# Patient Record
Sex: FEMALE | Race: BLACK OR AFRICAN AMERICAN | NOT HISPANIC OR LATINO | URBAN - METROPOLITAN AREA
[De-identification: names, ages, dates, MRNs, and addresses within clinical notes are randomized per-mention and may not be internally consistent; named-entity substitution may affect disease eponyms.]

---

## 2017-11-06 PROBLEM — 428283002 HISTORY OF POLYP OF COLON: Status: ACTIVE | Noted: 2017-11-06

## 2020-03-10 PROBLEM — 13645005 CHRONIC OBSTRUCTIVE PULMONARY DISEASE: Status: ACTIVE | Noted: 2020-03-10

## 2020-04-21 PROBLEM — 47505003 POSTTRAUMATIC STRESS DISORDER: Status: ACTIVE | Noted: 2020-04-21

## 2020-12-24 ENCOUNTER — OFFICE VISIT (OUTPATIENT)
Dept: URBAN - METROPOLITAN AREA CLINIC 44 | Facility: CLINIC | Age: 60
End: 2020-12-24

## 2020-12-28 ENCOUNTER — OFFICE VISIT (OUTPATIENT)
Dept: URBAN - METROPOLITAN AREA CLINIC 46 | Facility: CLINIC | Age: 60
End: 2020-12-28

## 2021-06-10 ENCOUNTER — OFFICE VISIT (OUTPATIENT)
Dept: URBAN - METROPOLITAN AREA CLINIC 44 | Facility: CLINIC | Age: 61
End: 2021-06-10

## 2021-08-28 ENCOUNTER — TELEPHONE ENCOUNTER (OUTPATIENT)
Dept: URBAN - METROPOLITAN AREA CLINIC 13 | Facility: CLINIC | Age: 61
End: 2021-08-28

## 2021-08-28 RX ORDER — GABAPENTIN 100 MG/1
CAPSULE ORAL
OUTPATIENT
End: 2018-09-24

## 2021-08-28 RX ORDER — DEXLANSOPRAZOLE 60 MG/1
CAPSULE, DELAYED RELEASE ORAL
OUTPATIENT
End: 2020-12-24

## 2021-08-28 RX ORDER — DICLOFENAC SODIUM 1 %
GEL (GRAM) TOPICAL
OUTPATIENT
End: 2018-09-24

## 2021-08-28 RX ORDER — IBUPROFEN 400 MG/1
TABLET, FILM COATED ORAL
OUTPATIENT
End: 2019-11-07

## 2021-08-28 RX ORDER — CYCLOBENZAPRINE HYDROCHLORIDE 5 MG/1
TABLET, FILM COATED ORAL
OUTPATIENT
End: 2017-11-07

## 2021-08-28 RX ORDER — ESTRADIOL 1 MG/1
TABLET ORAL
OUTPATIENT
End: 2018-09-24

## 2021-08-28 RX ORDER — PROMETHAZINE HYDROCHLORIDE 12.5 MG/1
TABLET ORAL
OUTPATIENT
End: 2020-04-21

## 2021-08-28 RX ORDER — MEDROXYPROGESTERONE ACETATE 5 MG/1
TABLET ORAL
OUTPATIENT
End: 2018-09-24

## 2021-08-28 RX ORDER — CYCLOBENZAPRINE HYDROCHLORIDE 10 MG/1
TABLET, FILM COATED ORAL
OUTPATIENT
End: 2021-06-10

## 2021-08-28 RX ORDER — ASPIRIN 81 MG/1
TABLET, COATED ORAL
OUTPATIENT
End: 2019-11-07

## 2021-08-28 RX ORDER — ESTROGENS, CONJUGATED 0.9 MG/1
TABLET, FILM COATED ORAL
OUTPATIENT
End: 2018-09-24

## 2021-08-28 RX ORDER — DEXLANSOPRAZOLE 60 MG/1
CAPSULE, DELAYED RELEASE ORAL
OUTPATIENT
End: 2017-12-07

## 2021-08-28 RX ORDER — LIDOCAINE 50 MG/G
OINTMENT TOPICAL
OUTPATIENT
End: 2018-09-24

## 2021-08-28 RX ORDER — PROMETHAZINE HYDROCHLORIDE 12.5 MG/1
TABLET ORAL
OUTPATIENT
End: 2019-11-07

## 2021-08-28 RX ORDER — FLUCONAZOLE 100 MG/1
TABLET ORAL
OUTPATIENT
Start: 2020-03-10 | End: 2020-04-21

## 2021-08-29 ENCOUNTER — TELEPHONE ENCOUNTER (OUTPATIENT)
Dept: URBAN - METROPOLITAN AREA CLINIC 13 | Facility: CLINIC | Age: 61
End: 2021-08-29

## 2021-08-29 RX ORDER — DEXLANSOPRAZOLE 60 MG/1
CAPSULE, DELAYED RELEASE ORAL
Status: ACTIVE | COMMUNITY

## 2021-08-29 RX ORDER — DICLOFENAC SODIUM 75 MG/1
TABLET, DELAYED RELEASE ORAL
Status: ACTIVE | COMMUNITY

## 2021-08-29 RX ORDER — PROMETHAZINE HYDROCHLORIDE 12.5 MG/1
TABLET ORAL
Status: ACTIVE | COMMUNITY

## 2021-08-29 RX ORDER — ESTROGEN,CON/M-PROGEST ACET 0.625-5 MG
TABLET ORAL
Status: ACTIVE | COMMUNITY

## 2021-08-29 RX ORDER — ONDANSETRON HYDROCHLORIDE 4 MG/1
TABLET, FILM COATED ORAL
Status: ACTIVE | COMMUNITY

## 2021-08-29 RX ORDER — CYCLOSPORINE 0.5 MG/ML
EMULSION OPHTHALMIC
Status: ACTIVE | COMMUNITY

## 2021-08-29 RX ORDER — DIAZEPAM 5 MG/1
TABLET ORAL
Status: ACTIVE | COMMUNITY

## 2021-10-28 ENCOUNTER — ERX REFILL RESPONSE (OUTPATIENT)
Dept: URBAN - METROPOLITAN AREA CLINIC 44 | Facility: CLINIC | Age: 61
End: 2021-10-28

## 2021-10-28 RX ORDER — ONDANSETRON HYDROCHLORIDE 4 MG/1
TABLET, FILM COATED ORAL
OUTPATIENT

## 2021-10-28 RX ORDER — ONDANSETRON 4 MG/1
DISSOLVE 1 TABLET ON THE TONGUE EVERY 6 TO 8 HOURS AS NEEDED TABLET, ORALLY DISINTEGRATING ORAL
Qty: 20 TABLET | Refills: 1 | OUTPATIENT

## 2021-12-20 ENCOUNTER — OFFICE VISIT (OUTPATIENT)
Dept: URBAN - METROPOLITAN AREA CLINIC 44 | Facility: CLINIC | Age: 61
End: 2021-12-20

## 2022-01-18 ENCOUNTER — DASHBOARD ENCOUNTERS (OUTPATIENT)
Age: 62
End: 2022-01-18

## 2022-01-19 ENCOUNTER — LAB OUTSIDE AN ENCOUNTER (OUTPATIENT)
Dept: URBAN - METROPOLITAN AREA CLINIC 44 | Facility: CLINIC | Age: 62
End: 2022-01-19

## 2022-01-19 ENCOUNTER — OFFICE VISIT (OUTPATIENT)
Dept: URBAN - METROPOLITAN AREA CLINIC 44 | Facility: CLINIC | Age: 62
End: 2022-01-19
Payer: MEDICARE

## 2022-01-19 VITALS
BODY MASS INDEX: 22.08 KG/M2 | DIASTOLIC BLOOD PRESSURE: 85 MMHG | SYSTOLIC BLOOD PRESSURE: 154 MMHG | TEMPERATURE: 98.8 F | HEART RATE: 61 BPM | HEIGHT: 63 IN | OXYGEN SATURATION: 98 % | WEIGHT: 124.6 LBS

## 2022-01-19 DIAGNOSIS — K21.9 GERD: ICD-10-CM

## 2022-01-19 DIAGNOSIS — B37.81 CANDIDAL ESOPHAGITIS: ICD-10-CM

## 2022-01-19 DIAGNOSIS — K61.1 PERIRECTAL ABSCESS: ICD-10-CM

## 2022-01-19 DIAGNOSIS — L98.8 FISTULA: ICD-10-CM

## 2022-01-19 PROBLEM — 235595009 GASTROESOPHAGEAL REFLUX DISEASE: Status: ACTIVE | Noted: 2017-11-06

## 2022-01-19 PROCEDURE — 99213 OFFICE O/P EST LOW 20 MIN: CPT | Performed by: INTERNAL MEDICINE

## 2022-01-19 RX ORDER — ONDANSETRON 4 MG/1
DISSOLVE 1 TABLET ON THE TONGUE EVERY 6 TO 8 HOURS AS NEEDED TABLET, ORALLY DISINTEGRATING ORAL
Qty: 20 TABLET | Refills: 1 | Status: DISCONTINUED | COMMUNITY

## 2022-01-19 RX ORDER — DIAZEPAM 5 MG/1
1 TABLET AS NEEDED TABLET ORAL
Status: ON HOLD | COMMUNITY

## 2022-01-19 RX ORDER — ESTROGEN,CON/M-PROGEST ACET 0.625-5 MG
TABLET ORAL
Status: DISCONTINUED | COMMUNITY

## 2022-01-19 RX ORDER — DICLOFENAC SODIUM 75 MG/1
1 TABLET AS NEEDED TABLET, DELAYED RELEASE ORAL TWICE A DAY
Status: ACTIVE | COMMUNITY

## 2022-01-19 RX ORDER — CYCLOSPORINE 0.5 MG/ML
1 DROP INTO AFFECTED EYE EMULSION OPHTHALMIC TWICE A DAY
Status: ACTIVE | COMMUNITY

## 2022-01-19 RX ORDER — LISINOPRIL AND HYDROCHLOROTHIAZIDE 10; 12.5 MG/1; MG/1
1 TABLET TABLET ORAL ONCE A DAY
Status: ON HOLD | COMMUNITY

## 2022-01-19 RX ORDER — CEPHALEXIN 500 MG/1
1 CAPSULE CAPSULE ORAL TWICE A DAY
Status: ACTIVE | COMMUNITY
Start: 2022-01-16 | End: 2022-01-23

## 2022-01-19 RX ORDER — IBUPROFEN 600 MG/1
1 TABLET WITH FOOD OR MILK AS NEEDED TABLET ORAL
Status: ACTIVE | COMMUNITY

## 2022-01-19 RX ORDER — PROMETHAZINE HYDROCHLORIDE 12.5 MG/1
TABLET ORAL
Status: DISCONTINUED | COMMUNITY

## 2022-01-19 RX ORDER — DEXLANSOPRAZOLE 60 MG/1
1 CAPSULE CAPSULE, DELAYED RELEASE ORAL ONCE A DAY
Status: ACTIVE | COMMUNITY
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
## 2022-02-24 ENCOUNTER — ERX REFILL RESPONSE (OUTPATIENT)
Dept: URBAN - METROPOLITAN AREA CLINIC 44 | Facility: CLINIC | Age: 62
End: 2022-02-24

## 2022-02-24 RX ORDER — ONDANSETRON 4 MG/1
DISSOLVE ONE TABLET BY MOUTH EVERY 6-8 HOURS AS NEEDED TABLET, ORALLY DISINTEGRATING ORAL
Qty: 20 TABLET | Refills: 0 | OUTPATIENT

## 2022-02-24 RX ORDER — ONDANSETRON 4 MG/1
DISSOLVE ONE TABLET BY MOUTH EVERY 6-8 HOURS AS NEEDED TABLET, ORALLY DISINTEGRATING ORAL
Qty: 20 TABLET | Refills: 1 | OUTPATIENT

## 2022-03-18 ENCOUNTER — ERX REFILL RESPONSE (OUTPATIENT)
Dept: URBAN - METROPOLITAN AREA CLINIC 44 | Facility: CLINIC | Age: 62
End: 2022-03-18

## 2022-03-18 RX ORDER — PROMETHAZINE HYDROCHLORIDE 12.5 MG/1
TAKE 1 TABLET BY MOUTH ONCE DAILY ALTERNATE ONDANSETRON AS NEEDED TABLET ORAL
Qty: 20 TABLET | Refills: 0 | OUTPATIENT

## 2022-03-18 RX ORDER — PROMETHAZINE HYDROCHLORIDE 12.5 MG/1
TAKE 1 TABLET BY MOUTH ONCE DAILY ALTERNATE ONDANSETRON AS NEEDED TABLET ORAL
Qty: 20 TABLET | Refills: 1 | OUTPATIENT

## 2022-06-22 ENCOUNTER — TELEPHONE ENCOUNTER (OUTPATIENT)
Dept: URBAN - METROPOLITAN AREA CLINIC 46 | Facility: CLINIC | Age: 62
End: 2022-06-22

## 2022-06-22 RX ORDER — PROMETHAZINE HYDROCHLORIDE 12.5 MG/1
TAKE 1 TABLET BY MOUTH ONCE DAILY ALTERNATE ONDANSETRON AS NEEDED TABLET ORAL
Qty: 20 TABLET | Refills: 1

## 2022-06-27 ENCOUNTER — ERX REFILL RESPONSE (OUTPATIENT)
Dept: URBAN - METROPOLITAN AREA CLINIC 44 | Facility: CLINIC | Age: 62
End: 2022-06-27

## 2022-06-27 RX ORDER — DEXLANSOPRAZOLE 60 MG/1
TAKE 1 CAPSULE BY MOUTH EVERY DAY CAPSULE, DELAYED RELEASE ORAL
Qty: 90 CAPSULE | Refills: 0 | OUTPATIENT

## 2022-11-14 ENCOUNTER — TELEPHONE ENCOUNTER (OUTPATIENT)
Dept: URBAN - METROPOLITAN AREA CLINIC 46 | Facility: CLINIC | Age: 62
End: 2022-11-14

## 2022-11-20 ENCOUNTER — ERX REFILL RESPONSE (OUTPATIENT)
Dept: URBAN - METROPOLITAN AREA CLINIC 44 | Facility: CLINIC | Age: 62
End: 2022-11-20

## 2022-11-20 RX ORDER — ONDANSETRON 4 MG/1
DISSOLVE ONE TABLET BY MOUTH EVERY 6-8 HOURS AS NEEDED TABLET, ORALLY DISINTEGRATING ORAL
Qty: 20 TABLET | Refills: 1 | OUTPATIENT

## 2022-11-20 RX ORDER — ONDANSETRON 4 MG/1
DISSOLVE ONE TABLET BY MOUTH EVERY 6-8 HOURS AS NEEDED TABLET, ORALLY DISINTEGRATING ORAL
Qty: 20 TABLET | Refills: 0 | OUTPATIENT

## 2024-06-04 NOTE — PHYSICAL EXAM CONSTITUTIONAL:
Orders to     Edward          Pt aware to get labs done no sooner than 2 weeks prior to the appt.  Pt aware to fast.  No call back required.]    Future Appointments   Date Time Provider Department Center   6/25/2024  8:00 AM Marshall Marsh MD [facility]        well developed, well nourished , in no acute distress , ambulating without difficulty , normal communication ability